# Patient Record
Sex: MALE | Race: WHITE | NOT HISPANIC OR LATINO | URBAN - METROPOLITAN AREA
[De-identification: names, ages, dates, MRNs, and addresses within clinical notes are randomized per-mention and may not be internally consistent; named-entity substitution may affect disease eponyms.]

---

## 2021-01-20 ENCOUNTER — EMERGENCY (EMERGENCY)
Facility: HOSPITAL | Age: 26
LOS: 1 days | Discharge: HOME | End: 2021-01-20
Admitting: EMERGENCY MEDICINE
Payer: COMMERCIAL

## 2021-01-20 VITALS
RESPIRATION RATE: 14 BRPM | SYSTOLIC BLOOD PRESSURE: 128 MMHG | DIASTOLIC BLOOD PRESSURE: 86 MMHG | HEART RATE: 66 BPM | OXYGEN SATURATION: 98 %

## 2021-01-20 VITALS
DIASTOLIC BLOOD PRESSURE: 94 MMHG | RESPIRATION RATE: 16 BRPM | HEART RATE: 71 BPM | TEMPERATURE: 98 F | SYSTOLIC BLOOD PRESSURE: 135 MMHG | OXYGEN SATURATION: 99 %
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
## 2021-01-20 DIAGNOSIS — Z23 ENCOUNTER FOR IMMUNIZATION: ICD-10-CM
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
## 2021-01-20 DIAGNOSIS — S90.31XA CONTUSION OF RIGHT FOOT, INITIAL ENCOUNTER: ICD-10-CM
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
## 2021-01-20 PROCEDURE — 99283 EMERGENCY DEPT VISIT LOW MDM: CPT | Mod: 25

## 2021-01-20 PROCEDURE — 73630 X-RAY EXAM OF FOOT: CPT | Mod: 26,RT

## 2021-01-20 RX ORDER — TETANUS TOXOID, REDUCED DIPHTHERIA TOXOID AND ACELLULAR PERTUSSIS VACCINE, ADSORBED 5; 2.5; 8; 8; 2.5 [IU]/.5ML; [IU]/.5ML; UG/.5ML; UG/.5ML; UG/.5ML
0.5 SUSPENSION INTRAMUSCULAR ONCE
Refills: 0 | Status: COMPLETED | OUTPATIENT
Start: 2021-01-20 | End: 2021-01-20

## 2021-01-20 RX ADMIN — TETANUS TOXOID, REDUCED DIPHTHERIA TOXOID AND ACELLULAR PERTUSSIS VACCINE, ADSORBED 0.5 MILLILITER(S): 5; 2.5; 8; 8; 2.5 SUSPENSION INTRAMUSCULAR at 09:47

## 2021-01-20 NOTE — ED PROVIDER NOTE - PHYSICAL EXAMINATION
VITAL SIGNS: I have reviewed nursing notes and confirm.  CONSTITUTIONAL: Well-developed; well-nourished; in no acute distress.   SKIN:  warm and dry, no acute rash.   HEAD:  normocephalic, atraumatic.  EYES: EOM intact; conjunctiva and sclera clear.  ENT: No nasal discharge; airway clear.   NECK: Supple; non tender.  EXT: Normal ROM. No clubbing, cyanosis or edema. 2+ pulses to b/l ue/le. RLE: Mild to moderate swelling to the right dorsal foot with superficial abrasions. No sensory or motor deficits. No ankle tenderness. Distal pulses intact. Soft compartments. Good capillary refill. Ambulatory.  NEURO: Alert, oriented, grossly unremarkable  PSYCH: Cooperative, mood and affect appropriate. VITAL SIGNS: I have reviewed nursing notes and confirm.  CONSTITUTIONAL: Well-developed; well-nourished; in no acute distress.   SKIN:  warm and dry, no acute rash.   HEAD:  normocephalic, atraumatic.  EYES: clear.  ENT: airway clear.   EXT:   RLE: Mild to moderate swelling to the right dorsal foot with superficial abrasions. No sensory or motor deficits. No ankle tenderness. Distal pulses intact. Soft compartments. Good capillary refill. Ambulatory.  NEURO: Alert, oriented, grossly unremarkable  PSYCH: Cooperative, mood and affect appropriate.

## 2021-01-20 NOTE — ED PROVIDER NOTE - PATIENT PORTAL LINK FT
You can access the FollowMyHealth Patient Portal offered by [facility] by registering at the following website: [email or web address]. By joining [facility]’s FollowMyHealth portal, you will also be able to view your health information using other applications (apps) compatible with our system.

## 2021-01-20 NOTE — ED PROVIDER NOTE - NSFOLLOWUPINSTRUCTIONS_ED_ALL_ED_FT
Keep wound clean and dry  Take Ibuprofen 600mg every 6-8 hours as needed for pain, take with food, and in addition you may take Tylenol 500 mg every 6-8 hours as needed for pain  Rest. Cool compresses.  Extremity elevation.  Ace wrap     Follow up with Orthopedics within 2-3 days    RETURN TO THE EMERGENCY DEPARTMENT FOR WORSENING PAIN, SWELLING, REDNESS, PUS, FEVER OR ANY CONCERNS.

## 2021-01-20 NOTE — ED PROVIDER NOTE - CLINICAL SUMMARY MEDICAL DECISION MAKING FREE TEXT BOX
Will order XR to r/o fracture and foreign bodies and update tetanus. Strict return precautions discussed with patient. right foot trauma, +superficial abrasions to dorsum of foot, nvi, tetanus updated, wound cleansed, irrigated with saline, dressed with bacitracin and bandaged, xrays prelim no fb or fx. wound care discussed, leg elevation, ace wrap. otc pain meds, f/u ortho prn. return precautions discussed

## 2021-01-20 NOTE — ED PROVIDER NOTE - CARE PROVIDER_API CALL
Lorenzo Albert)  Orthopaedic Surgery  [address], [address] [place] [address]  Phone: [phone]  Fax: [phone]  Follow Up Time:

## 2021-01-20 NOTE — ED PROVIDER NOTE - OBJECTIVE STATEMENT
25 year old Male states his puppy ran into a mirror at home and the mirror fell onto his right dorsal foot. The mirror did not break into small pieces and now complains of swelling and pain to the dorsal of the right foot. He was able to ambulate afterwards but sustained superficial cuts to the foot. Denies numbness, weakness, ankle pain, and any other injuries. Unknown tetanus. 25 year old Male states his puppy ran into a mirror at home and the mirror fell onto his right dorsal foot about an hour ago. The mirror did not break into small pieces, c/o of swelling and pain to the dorsal of the right foot with superficial cuts to the foot. He was able to ambulate afterwards sustained superficial cuts to the foot. Denies numbness, weakness, ankle pain, or other injuries. Unknown tetanus.

## 2021-01-20 NOTE — ED ADULT TRIAGE NOTE - CHIEF COMPLAINT QUOTE
100lb mirror fell onto patients right dorsal foot after puppy dog ran into mirror, mirror did not smash